# Patient Record
Sex: MALE | Race: OTHER | NOT HISPANIC OR LATINO | URBAN - METROPOLITAN AREA
[De-identification: names, ages, dates, MRNs, and addresses within clinical notes are randomized per-mention and may not be internally consistent; named-entity substitution may affect disease eponyms.]

---

## 2022-04-08 ENCOUNTER — EMERGENCY (EMERGENCY)
Facility: HOSPITAL | Age: 37
LOS: 1 days | Discharge: HOME | End: 2022-04-08
Attending: STUDENT IN AN ORGANIZED HEALTH CARE EDUCATION/TRAINING PROGRAM
Payer: SELF-PAY

## 2022-04-08 VITALS
WEIGHT: 154.98 LBS | OXYGEN SATURATION: 99 % | SYSTOLIC BLOOD PRESSURE: 155 MMHG | HEART RATE: 82 BPM | TEMPERATURE: 98 F | DIASTOLIC BLOOD PRESSURE: 105 MMHG | HEIGHT: 65 IN | RESPIRATION RATE: 16 BRPM

## 2022-04-08 PROCEDURE — 93010 ELECTROCARDIOGRAM REPORT: CPT

## 2022-04-08 PROCEDURE — 99053 MED SERV 10PM-8AM 24 HR FAC: CPT

## 2022-04-08 PROCEDURE — 99284 EMERGENCY DEPT VISIT MOD MDM: CPT

## 2022-04-08 PROCEDURE — 99283 EMERGENCY DEPT VISIT LOW MDM: CPT

## 2022-04-08 PROCEDURE — 93005 ELECTROCARDIOGRAM TRACING: CPT

## 2022-04-08 RX ORDER — SUCRALFATE 1 G
10 TABLET ORAL
Qty: 200 | Refills: 0
Start: 2022-04-08

## 2022-04-08 RX ORDER — LIDOCAINE 4 G/100G
1 CREAM TOPICAL
Qty: 15 | Refills: 0
Start: 2022-04-08

## 2022-04-08 NOTE — ED PROVIDER NOTE - PATIENT PORTAL LINK FT
You can access the FollowMyHealth Patient Portal offered by [facility] by registering at the following website: [email or web address]. By joining [facility]’s FollowMyHealth portal, you will also be able to view your health information using other applications (apps) compatible with our [email or web address]